# Patient Record
Sex: MALE | Race: NATIVE HAWAIIAN OR OTHER PACIFIC ISLANDER
[De-identification: names, ages, dates, MRNs, and addresses within clinical notes are randomized per-mention and may not be internally consistent; named-entity substitution may affect disease eponyms.]

---

## 2018-01-28 ENCOUNTER — HOSPITAL ENCOUNTER (EMERGENCY)
Dept: HOSPITAL 17 - NEPC | Age: 23
Discharge: HOME | End: 2018-01-28
Payer: COMMERCIAL

## 2018-01-28 VITALS
OXYGEN SATURATION: 99 % | SYSTOLIC BLOOD PRESSURE: 142 MMHG | HEART RATE: 102 BPM | DIASTOLIC BLOOD PRESSURE: 73 MMHG | RESPIRATION RATE: 16 BRPM | TEMPERATURE: 98.6 F

## 2018-01-28 VITALS — WEIGHT: 199.52 LBS | HEIGHT: 67 IN | BODY MASS INDEX: 31.31 KG/M2

## 2018-01-28 DIAGNOSIS — H11.421: Primary | ICD-10-CM

## 2018-01-28 LAB
BASOPHILS # BLD AUTO: 0 TH/MM3 (ref 0–0.2)
BASOPHILS NFR BLD: 0.4 % (ref 0–2)
BUN SERPL-MCNC: 15 MG/DL (ref 7–18)
CALCIUM SERPL-MCNC: 8.8 MG/DL (ref 8.5–10.1)
CHLORIDE SERPL-SCNC: 105 MEQ/L (ref 98–107)
CREAT SERPL-MCNC: 1.11 MG/DL (ref 0.6–1.3)
EOSINOPHIL # BLD: 0.2 TH/MM3 (ref 0–0.4)
EOSINOPHIL NFR BLD: 1.3 % (ref 0–4)
ERYTHROCYTE [DISTWIDTH] IN BLOOD BY AUTOMATED COUNT: 13.9 % (ref 11.6–17.2)
GFR SERPLBLD BASED ON 1.73 SQ M-ARVRAT: 81 ML/MIN (ref 89–?)
GLUCOSE SERPL-MCNC: 97 MG/DL (ref 74–106)
HCO3 BLD-SCNC: 29.3 MEQ/L (ref 21–32)
HCT VFR BLD CALC: 47.3 % (ref 39–51)
HGB BLD-MCNC: 16.6 GM/DL (ref 13–17)
LYMPHOCYTES # BLD AUTO: 2.5 TH/MM3 (ref 1–4.8)
LYMPHOCYTES NFR BLD AUTO: 19.1 % (ref 9–44)
MCH RBC QN AUTO: 29.9 PG (ref 27–34)
MCHC RBC AUTO-ENTMCNC: 35.2 % (ref 32–36)
MCV RBC AUTO: 84.9 FL (ref 80–100)
MONOCYTE #: 0.8 TH/MM3 (ref 0–0.9)
MONOCYTES NFR BLD: 6.2 % (ref 0–8)
NEUTROPHILS # BLD AUTO: 9.5 TH/MM3 (ref 1.8–7.7)
NEUTROPHILS NFR BLD AUTO: 73 % (ref 16–70)
PLATELET # BLD: 151 TH/MM3 (ref 150–450)
PMV BLD AUTO: 9 FL (ref 7–11)
RBC # BLD AUTO: 5.57 MIL/MM3 (ref 4.5–5.9)
SODIUM SERPL-SCNC: 141 MEQ/L (ref 136–145)
WBC # BLD AUTO: 13.1 TH/MM3 (ref 4–11)

## 2018-01-28 PROCEDURE — 96375 TX/PRO/DX INJ NEW DRUG ADDON: CPT

## 2018-01-28 PROCEDURE — 85025 COMPLETE CBC W/AUTO DIFF WBC: CPT

## 2018-01-28 PROCEDURE — 99284 EMERGENCY DEPT VISIT MOD MDM: CPT

## 2018-01-28 PROCEDURE — 80048 BASIC METABOLIC PNL TOTAL CA: CPT

## 2018-01-28 PROCEDURE — 96374 THER/PROPH/DIAG INJ IV PUSH: CPT

## 2018-01-28 NOTE — PD
HPI


Chief Complaint:  Eye Problems/Injury


Time Seen by Provider:  03:26


Travel History


International Travel<30 days:  No


Contact w/Intl Traveler<30days:  No


Traveled to known affect area:  No





History of Present Illness


HPI


22-year-old male patient presents to the ER today because he states that for 

several days he's noticed increased swelling and foreign body sensation in 

itchiness to the right eye.  He complains of some blurriness of vision.  He 

denies any fevers or any other issues.  Patient is not a contact lens user, had 

tried to use over-the-counter drops after the symptoms started but without 

significant improvement.





Modifying Factors: None


Associated Signs & Symptoms: Right eye irritation, swelling


Risk Factors: None





PFSH


Past Medical History


Anxiety:  Yes


Neurologic:  Yes (Lower back compressed disc)


Tetanus Vaccination:  Unknown


Influenza Vaccination:  No





Past Surgical History


Surgical History:  No Previous Surgery





Social History


Alcohol Use:  No


Tobacco Use:  No


Substance Use:  No





Allergies-Medications


(Allergen,Severity, Reaction):  


Coded Allergies:  


     No Known Allergies (Unverified  Adverse Reaction, Unknown, 1/28/18)


Reported Meds & Prescriptions





Reported Meds & Active Scripts


Active


Lorazepam 0.5 Mg Tab 0.5 Mg PO Q6H PRN


Z.0.wckdlv089 Mg 600 Mg Tab 600 Mg PO Q6 PRN


Z.0.percocet7.5 -   1 Tab PO Q6


     FOR PAIN


Z.0.ycltai01 Mg 50 Mg Tab 1 Tab PO Q6 PRN


     FOR PAIN


Z.0.flexeril5 Mg 5 Mg Tab 5 Mg PO BID PRN


     UNKNOWN DOSE


Z.0.cltbvpfzk640 Mg 800 Mg Tab 800 Mg PO TID








Review of Systems


Except as stated in HPI:  all other systems reviewed are Neg





Physical Exam


Narrative


GENERAL: Well-nourished, well-developed  male patient in mild 

distress.


SKIN: Focused skin assessment warm/dry.


HEAD: Normocephalic.


EYES: No scleral icterus.  There is notable right conjunctival injection 

especially laterally and significant chemosis.  Pupils are equal, round, 

reactive to light bilaterally.  Extraocular movements are intact.  Eyelids are 

fairly unaffected.


NECK: Supple, trachea midline. No JVD or lymphadenopathy.


CARDIOVASCULAR: Regular rate and rhythm without murmurs, gallops, or rubs. 


RESPIRATORY: Breath sounds equal bilaterally. No accessory muscle use.


GASTROINTESTINAL: Abdomen soft, non-tender, nondistended. 


MUSCULOSKELETAL: No cyanosis, or edema. 


BACK: Nontender without obvious deformity. No CVA tenderness.





Data


Data


Last Documented VS





Vital Signs








  Date Time  Temp Pulse Resp B/P (MAP) Pulse Ox O2 Delivery O2 Flow Rate FiO2


 


1/28/18 02:49 98.6 102 16 142/73 (96) 99 Room Air  








Orders





 Orders


Complete Blood Count With Diff (1/28/18 03:26)


Basic Metabolic Panel (Bmp) (1/28/18 03:26)


Proparacaine 0.5% Opth Soln (Alcaine 0.5 (1/28/18 03:30)


Diphenhydramine (Benadryl) (1/28/18 03:45)


Diphenhydramine Inj (Benadryl Inj) (1/28/18 03:45)


Methylprednisolone So Succ Inj (Solumedr (1/28/18 04:00)


Ed Discharge Order (1/28/18 04:37)





Labs





Laboratory Tests








Test


  1/28/18


03:45


 


White Blood Count 13.1 TH/MM3 


 


Red Blood Count 5.57 MIL/MM3 


 


Hemoglobin 16.6 GM/DL 


 


Hematocrit 47.3 % 


 


Mean Corpuscular Volume 84.9 FL 


 


Mean Corpuscular Hemoglobin 29.9 PG 


 


Mean Corpuscular Hemoglobin


Concent 35.2 % 


 


 


Red Cell Distribution Width 13.9 % 


 


Platelet Count 151 TH/MM3 


 


Mean Platelet Volume 9.0 FL 


 


Neutrophils (%) (Auto) 73.0 % 


 


Lymphocytes (%) (Auto) 19.1 % 


 


Monocytes (%) (Auto) 6.2 % 


 


Eosinophils (%) (Auto) 1.3 % 


 


Basophils (%) (Auto) 0.4 % 


 


Neutrophils # (Auto) 9.5 TH/MM3 


 


Lymphocytes # (Auto) 2.5 TH/MM3 


 


Monocytes # (Auto) 0.8 TH/MM3 


 


Eosinophils # (Auto) 0.2 TH/MM3 


 


Basophils # (Auto) 0.0 TH/MM3 


 


CBC Comment DIFF FINAL 


 


Differential Comment  


 


Blood Urea Nitrogen 15 MG/DL 


 


Creatinine 1.11 MG/DL 


 


Random Glucose 97 MG/DL 


 


Calcium Level 8.8 MG/DL 


 


Sodium Level 141 MEQ/L 


 


Potassium Level 3.9 MEQ/L 


 


Chloride Level 105 MEQ/L 


 


Carbon Dioxide Level 29.3 MEQ/L 


 


Anion Gap 7 MEQ/L 


 


Estimat Glomerular Filtration


Rate 81 ML/MIN 


 











MDM


Medical Decision Making


Medical Screen Exam Complete:  Yes


Emergency Medical Condition:  Yes


Medical Record Reviewed:  Yes


Interpretation(s)





Laboratory Tests








Test


  1/28/18


03:45


 


White Blood Count


  13.1 TH/MM3


(4.0-11.0)


 


Neutrophils (%) (Auto)


  73.0 %


(16.0-70.0)


 


Neutrophils # (Auto)


  9.5 TH/MM3


(1.8-7.7)


 


Estimat Glomerular Filtration


Rate 81 ML/MIN


(>89)








Differential Diagnosis


Chemosis, conjunctival erythema: Allergic reaction versus conjunctivitis versus 

orbital cellulitis versus periorbital cellulitis


Narrative Course


Fluorescein exam was done on the eye and did not show any signs of corneal 

abrasions.  Tonometry was also done on the right eye and it shows pressures of 

15, 16, and 21.  Extraocular movements are intact.  Pupils are reactive to 

light bilaterally.  Patient is 20/25 in both eyes.  At this point, I suspect 

that he has an underlying allergic reaction.  Benadryl and Solu-Medrol IV was 

given in the ER.  My plan would be to treat him with further antibiotic 

eyedrops and antihistamine eyedrops.  We will have him follow-up with 

ophthalmology.  Return for any worsening in symptoms as necessary.  The plan 

has been discussed with the patient and he states understanding.





Procedures


**Procedure Narrative**


Right eye tonometry: One drop of proparacaine was placed in the right eye and 

fluorescein strip was used to place yellow dye.  Woods lamp was used to examine 

the eye, did not show any signs of corneal abrasions.  And tonometry pen was 

used for evaluation of ocular pressures on the right: Pressures were 15, 16, 

and 21.





Diagnosis





 Primary Impression:  


 Chemosis of right conjunctiva


Referrals:  


Emelyn Keith MD


***Med/Other Pt SpecificInfo:  Prescription(s) given


Scripts


Ketotifen Opth Drops ([place]away Opth Drops) 0.025% Drops


1 DROP RIGHT EYE Q6H Y for ALLERGIC REACTION, #10 ML


   Prov: Jeremiah Nash MD         1/28/18 


Polymyxin B-Trimethoprim Opth Drops (Polytrim Opth Drops) 10,000-0.1 Unit/Ml-% 

Soln


1 DROP RIGHT EYE Q6HR for Mgmt Bacterial Infection, #1 BOTTLE 0 Refills


   Prov: Jeremiah Nash MD         1/28/18


Disposition:  01 DISCHARGE HOME


Condition:  Stable











Jeremiah Nash MD Jan 28, 2018 03:41